# Patient Record
Sex: FEMALE | Race: WHITE | NOT HISPANIC OR LATINO | Employment: FULL TIME | URBAN - METROPOLITAN AREA
[De-identification: names, ages, dates, MRNs, and addresses within clinical notes are randomized per-mention and may not be internally consistent; named-entity substitution may affect disease eponyms.]

---

## 2018-07-20 ENCOUNTER — OFFICE VISIT (OUTPATIENT)
Dept: FAMILY MEDICINE CLINIC | Facility: HOSPITAL | Age: 48
End: 2018-07-20
Payer: COMMERCIAL

## 2018-07-20 VITALS
HEART RATE: 80 BPM | SYSTOLIC BLOOD PRESSURE: 128 MMHG | DIASTOLIC BLOOD PRESSURE: 88 MMHG | BODY MASS INDEX: 46.65 KG/M2 | WEIGHT: 280 LBS | HEIGHT: 65 IN | TEMPERATURE: 98.8 F

## 2018-07-20 DIAGNOSIS — F41.9 ANXIETY: ICD-10-CM

## 2018-07-20 DIAGNOSIS — Z13.6 SCREENING FOR CARDIOVASCULAR CONDITION: ICD-10-CM

## 2018-07-20 DIAGNOSIS — I87.2 VENOUS INSUFFICIENCY, PERIPHERAL: Primary | ICD-10-CM

## 2018-07-20 DIAGNOSIS — M65.4 TENDINITIS, DE QUERVAIN'S: ICD-10-CM

## 2018-07-20 DIAGNOSIS — R60.9 PERIPHERAL EDEMA: ICD-10-CM

## 2018-07-20 PROCEDURE — 99204 OFFICE O/P NEW MOD 45 MIN: CPT | Performed by: FAMILY MEDICINE

## 2018-07-20 PROCEDURE — 3725F SCREEN DEPRESSION PERFORMED: CPT | Performed by: FAMILY MEDICINE

## 2018-07-20 PROCEDURE — 20605 DRAIN/INJ JOINT/BURSA W/O US: CPT | Performed by: FAMILY MEDICINE

## 2018-07-20 RX ORDER — BUSPIRONE HYDROCHLORIDE 5 MG/1
5 TABLET ORAL 3 TIMES DAILY
Qty: 90 TABLET | Refills: 0 | Status: SHIPPED | OUTPATIENT
Start: 2018-07-20 | End: 2018-08-20 | Stop reason: SDUPTHER

## 2018-07-20 RX ORDER — METHYLPREDNISOLONE ACETATE 40 MG/ML
1 INJECTION, SUSPENSION INTRA-ARTICULAR; INTRALESIONAL; INTRAMUSCULAR; SOFT TISSUE
Status: COMPLETED | OUTPATIENT
Start: 2018-07-20 | End: 2018-07-20

## 2018-07-20 RX ADMIN — METHYLPREDNISOLONE ACETATE 1 ML: 40 INJECTION, SUSPENSION INTRA-ARTICULAR; INTRALESIONAL; INTRAMUSCULAR; SOFT TISSUE at 16:42

## 2018-07-20 NOTE — PROGRESS NOTES
Medium joint arthrocentesis  Date/Time: 7/20/2018 4:42 PM  Consent given by: patient  Site marked: site marked  Timeout: Immediately prior to procedure a time out was called to verify the correct patient, procedure, equipment, support staff and site/side marked as required   Supporting Documentation  Indications: pain   Procedure Details  Location: wrist (at thumb extensor) -   Preparation: Patient was prepped and draped in the usual sterile fashion  Needle size: 25 G  Ultrasound guidance: no  Approach: anterolateral  Medications administered: 1 mL methylPREDNISolone acetate 40 mg/mL (0 1 ml of 80mg/ml)    Patient tolerance: patient tolerated the procedure well with no immediate complications  Dressing:  Sterile dressing applied

## 2018-08-16 ENCOUNTER — OFFICE VISIT (OUTPATIENT)
Dept: FAMILY MEDICINE CLINIC | Facility: HOSPITAL | Age: 48
End: 2018-08-16
Payer: COMMERCIAL

## 2018-08-16 VITALS
DIASTOLIC BLOOD PRESSURE: 82 MMHG | HEART RATE: 72 BPM | TEMPERATURE: 98.4 F | HEIGHT: 65 IN | BODY MASS INDEX: 46.85 KG/M2 | SYSTOLIC BLOOD PRESSURE: 138 MMHG | WEIGHT: 281.2 LBS

## 2018-08-16 DIAGNOSIS — F41.1 GENERALIZED ANXIETY DISORDER: ICD-10-CM

## 2018-08-16 DIAGNOSIS — M65.4 TENDINITIS, DE QUERVAIN'S: Primary | ICD-10-CM

## 2018-08-16 DIAGNOSIS — I87.2 VENOUS INSUFFICIENCY: ICD-10-CM

## 2018-08-16 PROBLEM — E78.5 HYPERLIPIDEMIA: Status: ACTIVE | Noted: 2018-08-16

## 2018-08-16 PROCEDURE — 99214 OFFICE O/P EST MOD 30 MIN: CPT | Performed by: FAMILY MEDICINE

## 2018-08-16 PROCEDURE — 3008F BODY MASS INDEX DOCD: CPT | Performed by: FAMILY MEDICINE

## 2018-08-16 RX ORDER — TRAMADOL HYDROCHLORIDE 50 MG/1
50 TABLET ORAL EVERY 8 HOURS PRN
Qty: 30 TABLET | Refills: 0 | Status: SHIPPED | OUTPATIENT
Start: 2018-08-16 | End: 2018-09-07 | Stop reason: SDUPTHER

## 2018-08-16 NOTE — PROGRESS NOTES
Assessment/Plan:        Problem List Items Addressed This Visit        Cardiovascular and Mediastinum    Venous insufficiency       Other    Generalized anxiety disorder      Other Visit Diagnoses     Tendinitis, de Quervain's    -  Primary    Relevant Medications    traMADol (ULTRAM) 50 mg tablet    Other Relevant Orders    Ambulatory referral to Occupational Therapy        Doing well with anxiety  Continue with the buspar  F/u in 3 months  Peripheral edema secondary to venous insufficiecy improved with compression stockings  Continue with leg elevation and slat resctriction  dequervains tendonitis  No improvement after steroid injection  Continue with wrist brace  Advised evaluation with occupational therapy  If no improvement will refer to Dr Lily Cruz  Hyperlipidemia  Advised improving her diet and regular exercise to improve this  To call our office if any concerns/questions at [phone]       ______________________________________________________________      Chief Complaint   Patient presents with    Follow-up     4 week    Wrist Pain     Right       History of Present Illness:  Here for follow up of  Of wirst pain  Minimal improvement with steroid injection  Wearing a wrist brace  She continues to have severe pain  She takes 15 tabs of 200 mg of advil a day  Sometimes still not helping  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  Edema  Improved with use of compression stockings  Better on her days off  No sob, no pnd, no orthopnea  Anxiety  Improved  Able to make it through her day now without panic attacks or increase anxiety  No depression  Tolerating buspar well  Labs were done and were reviewed  Review of Systems   Constitutional: Negative  Negative for activity change, appetite change, chills, diaphoresis, fatigue and fever  HENT: Negative for congestion, facial swelling and sore throat  Respiratory: Negative    Negative for apnea, cough, chest tightness and shortness of breath  Cardiovascular: Negative  Negative for chest pain and palpitations  Gastrointestinal: Negative  Negative for abdominal distention, abdominal pain, blood in stool, constipation, diarrhea and nausea  Genitourinary: Negative  Negative for difficulty urinating, dysuria, flank pain and frequency  Social History     Social History    Marital status: Single     Spouse name: N/A    Number of children: N/A    Years of education: N/A     Occupational History    Not on file  Social History Main Topics    Smoking status: Current Every Day Smoker     Types: Cigarettes    Smokeless tobacco: Never Used    Alcohol use Yes      Comment: Occasionally    Drug use: No    Sexual activity: Not on file     Other Topics Concern    Not on file     Social History Narrative    No narrative on file               No Known Allergies        Vitals:    08/16/18 1014   BP: 138/82   Pulse: 72   Temp: 98 4 °F (36 9 °C)     Body mass index is 46 79 kg/m²  Physical Exam   Constitutional: She appears well-developed and well-nourished  HENT:   Head: Normocephalic  Eyes: EOM are normal  Pupils are equal, round, and reactive to light  Neck: Normal range of motion  Cardiovascular: Normal rate and regular rhythm  Pulmonary/Chest: Effort normal and breath sounds normal    Musculoskeletal: She exhibits no edema  Right wrist: She exhibits tenderness  She exhibits normal range of motion, no bony tenderness, no swelling, no effusion, no crepitus, no deformity and no laceration  Psychiatric: She has a normal mood and affect  Her speech is normal and behavior is normal  Thought content normal  She is not actively hallucinating  She is attentive  Nursing note and vitals reviewed  No Follow-up on file

## 2018-08-20 DIAGNOSIS — F41.9 ANXIETY: ICD-10-CM

## 2018-08-22 RX ORDER — BUSPIRONE HYDROCHLORIDE 5 MG/1
5 TABLET ORAL 3 TIMES DAILY
Qty: 90 TABLET | Refills: 1 | Status: SHIPPED | OUTPATIENT
Start: 2018-08-22 | End: 2018-10-26 | Stop reason: SDUPTHER

## 2018-09-07 DIAGNOSIS — M65.4 TENDINITIS, DE QUERVAIN'S: ICD-10-CM

## 2018-09-07 RX ORDER — TRAMADOL HYDROCHLORIDE 50 MG/1
50 TABLET ORAL EVERY 8 HOURS PRN
Qty: 30 TABLET | Refills: 0 | Status: SHIPPED | OUTPATIENT
Start: 2018-09-07 | End: 2018-09-21 | Stop reason: SDUPTHER

## 2018-09-21 DIAGNOSIS — M65.4 TENDINITIS, DE QUERVAIN'S: ICD-10-CM

## 2018-09-21 RX ORDER — TRAMADOL HYDROCHLORIDE 50 MG/1
50 TABLET ORAL EVERY 8 HOURS PRN
Qty: 30 TABLET | Refills: 0 | Status: SHIPPED | OUTPATIENT
Start: 2018-09-21 | End: 2018-10-08 | Stop reason: SDUPTHER

## 2018-10-08 DIAGNOSIS — M65.4 TENDINITIS, DE QUERVAIN'S: ICD-10-CM

## 2018-10-10 RX ORDER — TRAMADOL HYDROCHLORIDE 50 MG/1
50 TABLET ORAL EVERY 8 HOURS PRN
Qty: 30 TABLET | Refills: 0 | Status: SHIPPED | OUTPATIENT
Start: 2018-10-10 | End: 2018-10-26 | Stop reason: SDUPTHER

## 2018-10-26 DIAGNOSIS — M65.4 TENDINITIS, DE QUERVAIN'S: ICD-10-CM

## 2018-10-26 DIAGNOSIS — F41.9 ANXIETY: ICD-10-CM

## 2018-10-26 RX ORDER — TRAMADOL HYDROCHLORIDE 50 MG/1
50 TABLET ORAL EVERY 8 HOURS PRN
Qty: 30 TABLET | Refills: 3 | Status: SHIPPED | OUTPATIENT
Start: 2018-10-26 | End: 2018-12-30 | Stop reason: SDUPTHER

## 2018-10-26 RX ORDER — BUSPIRONE HYDROCHLORIDE 5 MG/1
5 TABLET ORAL 3 TIMES DAILY
Qty: 90 TABLET | Refills: 0 | Status: SHIPPED | OUTPATIENT
Start: 2018-10-26 | End: 2018-11-21 | Stop reason: SDUPTHER

## 2018-11-21 ENCOUNTER — OFFICE VISIT (OUTPATIENT)
Dept: FAMILY MEDICINE CLINIC | Facility: HOSPITAL | Age: 48
End: 2018-11-21
Payer: COMMERCIAL

## 2018-11-21 VITALS
HEIGHT: 65 IN | DIASTOLIC BLOOD PRESSURE: 78 MMHG | TEMPERATURE: 97.3 F | WEIGHT: 270.4 LBS | SYSTOLIC BLOOD PRESSURE: 118 MMHG | HEART RATE: 67 BPM | BODY MASS INDEX: 45.05 KG/M2

## 2018-11-21 DIAGNOSIS — E78.5 HYPERLIPIDEMIA, UNSPECIFIED HYPERLIPIDEMIA TYPE: ICD-10-CM

## 2018-11-21 DIAGNOSIS — F41.1 GENERALIZED ANXIETY DISORDER: Primary | ICD-10-CM

## 2018-11-21 DIAGNOSIS — F41.9 ANXIETY: ICD-10-CM

## 2018-11-21 DIAGNOSIS — F17.210 CIGARETTE NICOTINE DEPENDENCE WITHOUT COMPLICATION: ICD-10-CM

## 2018-11-21 DIAGNOSIS — I87.2 VENOUS INSUFFICIENCY: ICD-10-CM

## 2018-11-21 PROCEDURE — 99214 OFFICE O/P EST MOD 30 MIN: CPT | Performed by: FAMILY MEDICINE

## 2018-11-21 PROCEDURE — 3008F BODY MASS INDEX DOCD: CPT | Performed by: FAMILY MEDICINE

## 2018-11-21 RX ORDER — BUSPIRONE HYDROCHLORIDE 5 MG/1
5 TABLET ORAL 3 TIMES DAILY
Qty: 270 TABLET | Refills: 1 | Status: SHIPPED | OUTPATIENT
Start: 2018-11-21 | End: 2019-09-19

## 2018-11-21 NOTE — PROGRESS NOTES
ASSESSMENT/PLAN:    Problem List Items Addressed This Visit        Cardiovascular and Mediastinum    Venous insufficiency       Other    Cigarette nicotine dependence without complication    Generalized anxiety disorder - Primary    Relevant Medications    busPIRone (BUSPAR) 5 mg tablet    Hyperlipidemia      Other Visit Diagnoses     Anxiety        Relevant Medications    busPIRone (BUSPAR) 5 mg tablet        Patient is doing well  NORMA  Stable  Doing well with buspar  HLD  Working on diet and increase activity  Nicotine  No plans of quitting at this time  Needing to get her household members to quit as well  Venous insufficiency  Stable  Improved with use of compression stockings  Return in about 6 months (around 5/21/2019)  To call our office if any concerns/questions at [phone]   ______________________________________________________________________          Patient is here for follow up of chronic conditions  Chief Complaint   Patient presents with    Follow-up     3 month        History of Present Illness:     Here for f/u of chronic conditions  Doing well  No new concerns  Reports anxiety is well controlled with no thoughts of depression  No si/hi  Leg swelling is controlled with use of compression stockings  ocntinues to smoke with no current plans of quitting  Multiple smokers in the home and hard to quit alone  Review of Systems   Constitutional: Negative  Negative for activity change, appetite change, chills, diaphoresis, fatigue and fever  HENT: Negative for congestion, facial swelling and sore throat  Respiratory: Negative  Negative for apnea, cough, chest tightness and shortness of breath  Cardiovascular: Negative  Negative for chest pain and palpitations  Gastrointestinal: Negative  Negative for abdominal distention, abdominal pain, blood in stool, constipation, diarrhea and nausea  Genitourinary: Negative    Negative for difficulty urinating, dysuria, flank pain and frequency  Social History     Social History    Marital status: Single     Spouse name: N/A    Number of children: N/A    Years of education: N/A     Occupational History    Not on file  Social History Main Topics    Smoking status: Current Every Day Smoker     Types: Cigarettes    Smokeless tobacco: Never Used    Alcohol use Yes      Comment: Occasionally    Drug use: No    Sexual activity: Not on file     Other Topics Concern    Not on file     Social History Narrative    No narrative on file               No Known Allergies      There is no immunization history on file for this patient  Vitals:    11/21/18 1713   BP: 118/78   Pulse: 67   Temp: (!) 97 3 °F (36 3 °C)     Body mass index is 45 kg/m²  Physical Exam   Constitutional: She appears well-developed and well-nourished  HENT:   Head: Normocephalic and atraumatic  Eyes: Pupils are equal, round, and reactive to light  Conjunctivae and EOM are normal    Neck: Normal range of motion  Neck supple  Cardiovascular: Normal rate, regular rhythm and normal heart sounds  Pulmonary/Chest: Effort normal and breath sounds normal    Abdominal: Soft  Bowel sounds are normal    Skin: Skin is warm and dry  Psychiatric: She has a normal mood and affect  Nursing note and vitals reviewed      Diabetic Foot Exam                Health Maintenance Due   Topic Date Due    Pneumococcal PPSV23 Medium Risk Adult (1 of 1 - PPSV23) 09/22/1991    DTaP,Tdap,and Td Vaccines (1 - Tdap) 09/22/1993    INFLUENZA VACCINE  07/01/2018

## 2018-12-30 DIAGNOSIS — M65.4 TENDINITIS, DE QUERVAIN'S: ICD-10-CM

## 2018-12-31 RX ORDER — TRAMADOL HYDROCHLORIDE 50 MG/1
TABLET ORAL
Qty: 30 TABLET | Refills: 0 | Status: SHIPPED | OUTPATIENT
Start: 2018-12-31 | End: 2019-01-18 | Stop reason: SDUPTHER

## 2019-01-18 DIAGNOSIS — M65.4 TENDINITIS, DE QUERVAIN'S: ICD-10-CM

## 2019-01-21 RX ORDER — TRAMADOL HYDROCHLORIDE 50 MG/1
TABLET ORAL
Qty: 30 TABLET | Refills: 0 | Status: SHIPPED | OUTPATIENT
Start: 2019-01-21 | End: 2019-02-05 | Stop reason: SDUPTHER

## 2019-02-05 DIAGNOSIS — M65.4 TENDINITIS, DE QUERVAIN'S: ICD-10-CM

## 2019-02-06 RX ORDER — TRAMADOL HYDROCHLORIDE 50 MG/1
TABLET ORAL
Qty: 30 TABLET | Refills: 0 | Status: SHIPPED | OUTPATIENT
Start: 2019-02-06

## 2019-02-28 DIAGNOSIS — M65.4 TENDINITIS, DE QUERVAIN'S: ICD-10-CM

## 2019-02-28 RX ORDER — TRAMADOL HYDROCHLORIDE 50 MG/1
TABLET ORAL
Qty: 30 TABLET | Refills: 0 | OUTPATIENT
Start: 2019-02-28

## 2019-02-28 NOTE — TELEPHONE ENCOUNTER
Please call  Patient with request for tramadol  This was initially given for wrist pain  Appears that the refill request has been more frequent  IF this is still hurting I highly recommend seeing Orthopedics, Dr America Pickens  I did not fill the tramadol  This was filled 3 weeks ago

## 2019-07-18 ENCOUNTER — APPOINTMENT (EMERGENCY)
Dept: RADIOLOGY | Facility: HOSPITAL | Age: 49
End: 2019-07-18
Payer: OTHER MISCELLANEOUS

## 2019-07-18 ENCOUNTER — HOSPITAL ENCOUNTER (EMERGENCY)
Facility: HOSPITAL | Age: 49
Discharge: HOME | End: 2019-07-18
Attending: EMERGENCY MEDICINE | Admitting: EMERGENCY MEDICINE
Payer: OTHER MISCELLANEOUS

## 2019-07-18 VITALS
SYSTOLIC BLOOD PRESSURE: 129 MMHG | HEIGHT: 67 IN | BODY MASS INDEX: 37.67 KG/M2 | OXYGEN SATURATION: 96 % | TEMPERATURE: 97.2 F | HEART RATE: 65 BPM | RESPIRATION RATE: 16 BRPM | DIASTOLIC BLOOD PRESSURE: 61 MMHG | WEIGHT: 240 LBS

## 2019-07-18 DIAGNOSIS — S43.014A ANTERIOR DISLOCATION OF RIGHT SHOULDER, INITIAL ENCOUNTER: Primary | ICD-10-CM

## 2019-07-18 DIAGNOSIS — S80.02XA CONTUSION OF LEFT KNEE: ICD-10-CM

## 2019-07-18 PROCEDURE — 23650 CLTX SHO DSLC W/MNPJ WO ANES: CPT | Performed by: EMERGENCY MEDICINE

## 2019-07-18 PROCEDURE — 73030 X-RAY EXAM OF SHOULDER: CPT

## 2019-07-18 PROCEDURE — 99285 EMERGENCY DEPT VISIT HI MDM: CPT | Performed by: EMERGENCY MEDICINE

## 2019-07-18 PROCEDURE — 99153 MOD SED SAME PHYS/QHP EA: CPT | Performed by: EMERGENCY MEDICINE

## 2019-07-18 PROCEDURE — 96374 THER/PROPH/DIAG INJ IV PUSH: CPT

## 2019-07-18 PROCEDURE — 73564 X-RAY EXAM KNEE 4 OR MORE: CPT

## 2019-07-18 PROCEDURE — 99152 MOD SED SAME PHYS/QHP 5/>YRS: CPT | Performed by: EMERGENCY MEDICINE

## 2019-07-18 PROCEDURE — 96372 THER/PROPH/DIAG INJ SC/IM: CPT

## 2019-07-18 PROCEDURE — 99284 EMERGENCY DEPT VISIT MOD MDM: CPT

## 2019-07-18 RX ORDER — MIDAZOLAM HYDROCHLORIDE 1 MG/ML
5 INJECTION INTRAMUSCULAR; INTRAVENOUS ONCE
Status: COMPLETED | OUTPATIENT
Start: 2019-07-18 | End: 2019-07-18

## 2019-07-18 RX ORDER — KETOROLAC TROMETHAMINE 30 MG/ML
30 INJECTION, SOLUTION INTRAMUSCULAR; INTRAVENOUS ONCE
Status: COMPLETED | OUTPATIENT
Start: 2019-07-18 | End: 2019-07-18

## 2019-07-18 RX ORDER — FENTANYL CITRATE 50 UG/ML
100 INJECTION, SOLUTION INTRAMUSCULAR; INTRAVENOUS ONCE
Status: COMPLETED | OUTPATIENT
Start: 2019-07-18 | End: 2019-07-18

## 2019-07-18 RX ADMIN — FENTANYL CITRATE 100 MCG: 50 INJECTION, SOLUTION INTRAMUSCULAR; INTRAVENOUS at 12:20

## 2019-07-18 RX ADMIN — KETOROLAC TROMETHAMINE 30 MG: 30 INJECTION, SOLUTION INTRAMUSCULAR; INTRAVENOUS at 10:28

## 2019-07-18 RX ADMIN — MIDAZOLAM HYDROCHLORIDE 3 MG: 1 INJECTION, SOLUTION INTRAMUSCULAR; INTRAVENOUS at 12:20

## 2019-07-18 NOTE — ED PROVIDER NOTES
History  Chief Complaint   Patient presents with    Shoulder Injury     pt presents to ER stating she was at work, the shipment that came in [address] stacked correctly, tipped over, patient then tried to get out of the way, tripped and fell into a wall on her right arm  states her right shoulder hurts and feels as though she cant move it  This is a 45-year-old female presents for evaluation after she tripped over boxes that fell at work complaining of right shoulder and knee pain no head injury or loss of consciousness last meal was at 7 o'clock this morning coffee in a eggs      History provided by:  Patient  Injury   Location:  Right shoulder and left knee pain  Quality:  Achy  Severity:  Severe  Onset quality:  Sudden  Timing:  Constant  Progression:  Unchanged  Chronicity:  New  Context:  Trip and fell at work  Relieved by:  Nothing  Worsened by: Movement  Associated symptoms: no abdominal pain        Prior to Admission Medications   Prescriptions Last Dose Informant Patient Reported? Taking?   busPIRone (BUSPAR) 5 mg tablet   No No   Sig: Take 1 tablet (5 mg total) by mouth 3 (three) times a day for 180 days   traMADol (ULTRAM) 50 mg tablet   No No   Sig: TAKE 1 TABLET BY MOUTH EVERY 8 HOURS AS NEEDED FOR MODERATE PAIN      Facility-Administered Medications: None       Past Medical History:   Diagnosis Date    Depression        Past Surgical History:   Procedure Laterality Date    ECTOPIC PREGNANCY SURGERY  2010       History reviewed  No pertinent family history  I have reviewed and agree with the history as documented  Social History     Tobacco Use    Smoking status: Current Every Day Smoker     Packs/day: 0 50     Types: Cigarettes    Smokeless tobacco: Never Used   Substance Use Topics    Alcohol use: Yes     Comment: Occasionally    Drug use: No        Review of Systems   Gastrointestinal: Negative for abdominal pain     Musculoskeletal:        Right shoulder and left knee pain   All other systems reviewed and are negative  Physical Exam  Physical Exam   Constitutional: She is oriented to person, place, and time  She appears well-developed and well-nourished  No distress  HENT:   Head: Normocephalic and atraumatic  Right Ear: External ear normal    Left Ear: External ear normal    Nose: Nose normal    Mouth/Throat: Oropharynx is clear and moist    Eyes: Pupils are equal, round, and reactive to light  EOM are normal  Right eye exhibits no discharge  Left eye exhibits no discharge  No scleral icterus  Neck: Neck supple  No JVD present  No tracheal deviation present  Cardiovascular: Normal rate, regular rhythm and intact distal pulses  Exam reveals no gallop and no friction rub  No murmur heard  Pulmonary/Chest: Effort normal and breath sounds normal  No stridor  No respiratory distress  She has no wheezes  She has no rales  Abdominal: Soft  Bowel sounds are normal  She exhibits no distension and no mass  There is no tenderness  There is no guarding  Musculoskeletal:   Right shoulder pain with deformity and decreased range of motion secondary to pain  Pulses and gross sensation are intact small contusion to the left knee   Neurological: She is alert and oriented to person, place, and time  She displays normal reflexes  No cranial nerve deficit or sensory deficit  She exhibits normal muscle tone  Coordination normal    Skin: Skin is warm and dry  No rash noted  She is not diaphoretic  Psychiatric: She has a normal mood and affect  Her behavior is normal  Thought content normal    Nursing note and vitals reviewed        Vital Signs  ED Triage Vitals [07/18/19 0954]   Temperature Pulse Respirations Blood Pressure SpO2   (!) 97 2 °F (36 2 °C) 84 19 157/86 95 %      Temp Source Heart Rate Source Patient Position - Orthostatic VS BP Location FiO2 (%)   Temporal Monitor Lying Right arm --      Pain Score       Worst Possible Pain           Vitals:    07/18/19 1231 07/18/19 1235 07/18/19 1240 07/18/19 1245   BP: 106/56 106/55 104/56 106/56   Pulse: 77 76 68 74   Patient Position - Orthostatic VS: Lying Lying Lying Lying         Visual Acuity      ED Medications  Medications   ketorolac (TORADOL) injection 30 mg (30 mg Intramuscular Given 7/18/19 1028)   fentanyl citrate (PF) 100 MCG/2ML 100 mcg (50 mcg Intravenous Given 7/18/19 1220)   midazolam (VERSED) injection 5 mg (3 mg Intravenous Given 7/18/19 1220)       Diagnostic Studies  Results Reviewed     None                 XR shoulder 2+ views RIGHT   Final Result by Latasha Alvarez MD (07/18 1032)      Right shoulder dislocation  The study was marked in EPIC for significant notification  Workstation performed: [id number]         XR knee 4+ vw left injury   Final Result by Latasha Alvarez MD (07/18 1038)      No acute osseous abnormality  Workstation performed: [id number]         XR shoulder 2+ views RIGHT    (Results Pending)              Procedures  Pre-Procedural Sedation  Performed by: Sara Mott DO  Authorized by: Sara Mott DO     Consent:     Consent obtained:  Written    Consent given by:  Patient    Risks discussed:  Prolonged sedation necessitating reversal, respiratory compromise necessitating ventilatory assistance and intubation, vomiting, nausea and allergic reaction  Universal protocol:     Patient identity confirmation method:  Verbally with patient and arm band  Indications:     Sedation purpose:  Dislocation reduction    Procedure necessitating sedation performed by:  Physician performing sedation    Intended level of sedation:  Moderate (conscious sedation)  Pre-sedation assessment:     Time since last food or drink:  7:00 a m   Coffee and eggs    ASA classification: class 1 - normal, healthy patient      Neck mobility: normal      Mouth opening:  3 or more finger widths    Mallampati score:  II - soft palate, uvula, fauces visible    Pre-sedation assessments completed and reviewed: airway patency, cardiovascular function, mental status, nausea/vomiting, pain level, respiratory function and temperature      History of difficult intubation: no      Pre-sedation assessment completed:  7/18/2019 12:00 PM  Procedural Sedation  Date/Time: 7/18/2019 12:10 PM  Performed by: Hilda Schneider DO  Authorized by: Hilda Schneider DO     Procedure details (see MAR for exact dosages):     Preoxygenation:  Nasal cannula    Sedation:  Midazolam    Analgesia:  Fentanyl    Intra-procedure monitoring:  Blood pressure monitoring, cardiac monitor, continuous pulse oximetry, frequent LOC assessments and frequent vital sign checks    Intra-procedure events: none      Sedation end time:  7/18/2019 12:30 PM    Total sedation time (minutes):  30  Post-procedure details:     Attendance: Constant attendance by certified staff until patient recovered      Post-sedation assessments completed and reviewed: airway patency, mental status, nausea/vomiting, pain level and respiratory function      Patient is stable for discharge or admission: yes      Patient tolerance: Tolerated well, no immediate complications  Orthopedic injury treatment  Date/Time: 7/18/2019 12:37 PM  Performed by: Hilda Schneider DO  Authorized by: Hilda Schneider DO     Patient Location:  ED  Injury location:  Shoulder  Location details:  Right shoulder  Injury type:  Dislocation  Dislocation type: anterior    Chronicity:  New  Neurovascular status: Neurovascularly intact    Distal perfusion: normal    Neurological function: normal    Range of motion: reduced    Sedation type:   Moderate (conscious) sedation (See separate Procedural Sedation form)  Manipulation performed?: Yes    Reduction method:  Traction and counter traction  Reduction method:  Traction and counter traction  Reduction method:  Traction and counter traction  Reduction method:  Traction and counter traction  Reduction method:  Traction and counter traction  Reduction method:  Traction and counter traction  Reduction successful?: Yes    Confirmation: Reduction confirmed by x-ray    Immobilization:  Sling  Neurovascular status: Neurovascularly intact    Distal perfusion: normal    Neurological function: normal    Range of motion: improved    Patient tolerance:  Patient tolerated the procedure well with no immediate complications      Conscious Sedation Assessment      Classification Score   ASA Scale Assessment  2-Mild to moderate systemic disease, medically well controlled, with no functional limitation filed at 07/18/2019 1215           ED Course  ED Course as of Jul 18 1247   Thu Jul 18, 2019   1244 Post reduction x-ray appears to be in good position      1244 Tingling in the fingers has resolved gross sensation and pulses are normal and movement to all digits of the right hand is normal                                  MDM    Disposition  Final diagnoses:   Anterior dislocation of right shoulder, initial encounter   Contusion of left knee     Time reflects when diagnosis was documented in both MDM as applicable and the Disposition within this note     Time User Action Codes Description Comment    7/18/2019 12:42 PM Asmita Sr Add [S43 014A] Anterior dislocation of right shoulder, initial encounter     7/18/2019 12:42 PM Asmita Sr Add [S80 02XA] Contusion of left knee       ED Disposition     ED Disposition Condition Date/Time Comment    Discharge Stable Thu Jul 18, 2019 12:45 PM Corbin Hebert discharge to home/self care              Follow-up Information     Follow up With Specialties Details Why Contact Info Additional [address] [facility] In 1 week Recheck [address] [facility]  [place] [id number] [address] [facility], [address] , [place], [place], [address]          Patient's Medications   Discharge Prescriptions    No medications on file     No discharge procedures on file     ED Provider  Electronically Signed by           Angeli Porras, DO  07/18/19 5760 [facility], DO  08/09/19 6266

## 2019-07-25 ENCOUNTER — APPOINTMENT (OUTPATIENT)
Dept: RADIOLOGY | Facility: CLINIC | Age: 49
End: 2019-07-25
Payer: OTHER MISCELLANEOUS

## 2019-07-25 ENCOUNTER — OFFICE VISIT (OUTPATIENT)
Dept: OBGYN CLINIC | Facility: CLINIC | Age: 49
End: 2019-07-25
Payer: OTHER MISCELLANEOUS

## 2019-07-25 VITALS
HEIGHT: 66 IN | WEIGHT: 266 LBS | DIASTOLIC BLOOD PRESSURE: 80 MMHG | SYSTOLIC BLOOD PRESSURE: 122 MMHG | BODY MASS INDEX: 42.75 KG/M2 | HEART RATE: 84 BPM

## 2019-07-25 DIAGNOSIS — S43.014A TRAUMATIC ANTERIOR DISLOCATION OF RIGHT SHOULDER, INITIAL ENCOUNTER: Primary | ICD-10-CM

## 2019-07-25 DIAGNOSIS — S43.004A SHOULDER DISLOCATION, RIGHT, INITIAL ENCOUNTER: ICD-10-CM

## 2019-07-25 PROCEDURE — 73030 X-RAY EXAM OF SHOULDER: CPT

## 2019-07-25 PROCEDURE — 99203 OFFICE O/P NEW LOW 30 MIN: CPT | Performed by: ORTHOPAEDIC SURGERY

## 2019-07-25 RX ORDER — IBUPROFEN 200 MG
TABLET ORAL EVERY 6 HOURS PRN
COMMUNITY

## 2019-07-25 NOTE — ASSESSMENT & PLAN NOTE
Findings consistent with right shoulder anterior dislocation  Discussed findings and treatment options with the patient  I reviewed patient's right shoulder x-ray with her  I discussed prognosis of her shoulder injury  I recommended patient to start doing pendulum and passive range of motion as tolerated  Patient should continue use the sling for comfort  She should remove the sling and move her elbow to prevent stiffness  We will limit patient's work activity using the right arm  I will see patient back in 4 weeks for re-evaluation  If patient continued to have difficulty lifting her right arm, may have to consider MRI of her right shoulder to assess the rotator cuff  All patient's questions were answered to her satisfaction  This note is created using dictation transcription  It may contain typographical errors, grammatical errors, improperly dictated words, background noise and other errors

## 2019-07-25 NOTE — PROGRESS NOTES
Assessment:     1  Traumatic anterior dislocation of right shoulder, initial encounter        Plan:     Problem List Items Addressed This Visit        Musculoskeletal and Integument    Traumatic anterior dislocation of right shoulder - Primary     Findings consistent with right shoulder anterior dislocation  Discussed findings and treatment options with the patient  I reviewed patient's right shoulder x-ray with her  I discussed prognosis of her shoulder injury  I recommended patient to start doing pendulum and passive range of motion as tolerated  Patient should continue use the sling for comfort  She should remove the sling and move her elbow to prevent stiffness  We will limit patient's work activity using the right arm  I will see patient back in 4 weeks for re-evaluation  If patient continued to have difficulty lifting her right arm, may have to consider MRI of her right shoulder to assess the rotator cuff  All patient's questions were answered to her satisfaction  This note is created using dictation transcription  It may contain typographical errors, grammatical errors, improperly dictated words, background noise and other errors  Relevant Orders    XR shoulder 2+ vw right         Subjective:     Patient ID: Leelee Houge is a 57 y o  female  Chief Complaint:  31-year-old female injured her right shoulder when she fell at work on 7/18/2019  She had immediate pain in her right shoulder with difficulty moving the arm  She denied other injury  No loss of consciousness  Patient was seen in the ER and found to have dislocated her right shoulder  She had conscious sedation and closed reduction of the shoulder which appeared to be successful  She continued to complain of pain in her shoulder and difficulty moving her right arm due to pain in the shoulder  She has been using a sling  She denies pain in her neck  Information on patient's intake form was reviewed      Allergy:  No Known Allergies  Medications:  all current active meds have been reviewed  Past Medical History:  Past Medical History:   Diagnosis Date    Depression      Past Surgical History:  Past Surgical History:   Procedure Laterality Date    ECTOPIC PREGNANCY SURGERY  2010     Family History:  Family History   Problem Relation Age of Onset    Diabetes Mother     Hypertension Mother     Heart disease Father     Hypertension Sister      Social History:  Social History     Substance and Sexual Activity   Alcohol Use Yes    Comment: Occasionally     Social History     Substance and Sexual Activity   Drug Use No     Social History     Tobacco Use   Smoking Status Current Every Day Smoker    Packs/day: 0 50    Types: Cigarettes   Smokeless Tobacco Never Used     Review of Systems   Constitutional: Negative  HENT: Negative  Eyes: Negative  Respiratory: Negative  Cardiovascular: Negative  Gastrointestinal: Negative  Endocrine: Negative  Genitourinary: Negative  Musculoskeletal: Positive for arthralgias (Right shoulder)  Skin: Negative  Allergic/Immunologic: Negative  Neurological: Positive for weakness (Right shoulder)  Negative for numbness  Hematological: Negative  Psychiatric/Behavioral: Negative  Objective:  BP Readings from Last 1 Encounters:   07/25/19 122/80      Wt Readings from Last 1 Encounters:   07/25/19 121 kg (266 lb)      BMI:   Estimated body mass index is 42 93 kg/m² as calculated from the following:    Height as of this encounter: 5' 6" (1 676 m)  Weight as of this encounter: 121 kg (266 lb)  BSA:   Estimated body surface area is 2 26 meters squared as calculated from the following:    Height as of this encounter: 5' 6" (1 676 m)  Weight as of this encounter: 121 kg (266 lb)  Physical Exam   Constitutional: She is oriented to person, place, and time  She appears well-developed  HENT:   Head: Normocephalic and atraumatic     Eyes: Conjunctivae and EOM are normal    Neck: Neck supple  Pulmonary/Chest: Effort normal    Neurological: She is alert and oriented to person, place, and time  Skin: Skin is warm  Psychiatric: She has a normal mood and affect  Nursing note and vitals reviewed  Right Shoulder Exam     Tenderness   Right shoulder tenderness location: Diffuse tenderness anteriorly  Range of Motion   Active abduction: abnormal   Passive abduction: abnormal   Forward flexion: abnormal     Tests   Drop arm: positive    Other   Erythema: absent  Sensation: normal  Pulse: present    Comments:  She has limited active and passive range of motion            I have personally reviewed pertinent films in PACS and my interpretation is Right shoulder show good alignment  Type 2 acromion process  Acromioclavicular joint osteoarthritis  No soft tissue calcification or fracture

## 2019-08-22 ENCOUNTER — OFFICE VISIT (OUTPATIENT)
Dept: OBGYN CLINIC | Facility: CLINIC | Age: 49
End: 2019-08-22
Payer: OTHER MISCELLANEOUS

## 2019-08-22 VITALS
HEIGHT: 66 IN | HEART RATE: 78 BPM | DIASTOLIC BLOOD PRESSURE: 80 MMHG | WEIGHT: 276 LBS | SYSTOLIC BLOOD PRESSURE: 120 MMHG | BODY MASS INDEX: 44.36 KG/M2

## 2019-08-22 DIAGNOSIS — S43.014A TRAUMATIC ANTERIOR DISLOCATION OF RIGHT SHOULDER, INITIAL ENCOUNTER: Primary | ICD-10-CM

## 2019-08-22 PROCEDURE — 99213 OFFICE O/P EST LOW 20 MIN: CPT | Performed by: ORTHOPAEDIC SURGERY

## 2019-08-22 NOTE — PROGRESS NOTES
Assessment:     1  Traumatic anterior dislocation of right shoulder, initial encounter        Plan:     Problem List Items Addressed This Visit        Musculoskeletal and Integument    Traumatic anterior dislocation of right shoulder - Primary    Relevant Orders    Ambulatory referral to Physical Therapy          Status post right shoulder dislocation sustained at work on 07/18/2019  · Discontinue sling  · Avoid internal rotation with overhead motions  · Start physical therapy  · Work limitation discussed with patient and her    · Follow up 6 weeks    Subjective:     Patient ID: Lisette Rojas is a 57 y o  female  Chief Complaint:  Patient presents to the office status post right shoulder dislocation sustained due to a fall at work on 07/18/2019  She has been compliant with her sling  She reports improved pain and tingling sensations since last visit  She has no new complaints or concerns today in the office  Allergy:  No Known Allergies  Medications:  all current active meds have been reviewed  Past Medical History:  Past Medical History:   Diagnosis Date    Depression      Past Surgical History:  Past Surgical History:   Procedure Laterality Date    ECTOPIC PREGNANCY SURGERY  2010     Family History:  Family History   Problem Relation Age of Onset    Diabetes Mother     Hypertension Mother     Heart disease Father     Hypertension Sister      Social History:  Social History     Substance and Sexual Activity   Alcohol Use Yes    Comment: Occasionally     Social History     Substance and Sexual Activity   Drug Use No     Social History     Tobacco Use   Smoking Status Current Every Day Smoker    Packs/day: 0 50    Types: Cigarettes   Smokeless Tobacco Never Used     Review of Systems   Constitutional: Negative for fever and unexpected weight change  HENT: Negative for hearing loss, nosebleeds and sore throat  Eyes: Negative for pain, redness and visual disturbance     Respiratory: Negative for cough, shortness of breath and wheezing  Cardiovascular: Negative for chest pain, palpitations and leg swelling  Gastrointestinal: Negative for abdominal pain, nausea and vomiting  Endocrine: Negative for polydipsia and polyuria  Genitourinary: Negative for dysuria and hematuria  Musculoskeletal: Negative for arthralgias  Skin: Negative for rash and wound  Neurological: Negative for dizziness and headaches  Psychiatric/Behavioral: Negative for agitation and suicidal ideas  Objective:  BP Readings from Last 1 Encounters:   08/22/19 120/80      Wt Readings from Last 1 Encounters:   08/22/19 125 kg (276 lb)      BMI:   Estimated body mass index is 44 55 kg/m² as calculated from the following:    Height as of this encounter: 5' 6" (1 676 m)  Weight as of this encounter: 125 kg (276 lb)  BSA:   Estimated body surface area is 2 29 meters squared as calculated from the following:    Height as of this encounter: 5' 6" (1 676 m)  Weight as of this encounter: 125 kg (276 lb)  Physical Exam   Constitutional: She is oriented to person, place, and time  She appears well-developed and well-nourished  HENT:   Head: Normocephalic and atraumatic  Eyes: Conjunctivae and EOM are normal    Neck: Neck supple  Cardiovascular: Intact distal pulses  Pulmonary/Chest: Effort normal    Neurological: She is alert and oriented to person, place, and time  Skin: Skin is warm and dry  Psychiatric: She has a normal mood and affect  Her behavior is normal      Right Shoulder Exam     Tenderness   The patient is experiencing no tenderness      Range of Motion   Active abduction: 40   Forward flexion: 60     Muscle Strength   Abduction: 4/5   Internal rotation: 5/5   External rotation: 4/5     Other   Erythema: absent  Sensation: normal  Pulse: present    Comments:  She is able to actively resist abduction      Left Shoulder Exam   Left shoulder exam is normal     Range of Motion   The patient has normal left shoulder ROM  Muscle Strength   The patient has normal left shoulder strength      Other   Sensation: normal  Pulse: present               Scribe Attestation    I,:   Randi Garza am acting as a scribe while in the presence of the attending physician :        I,:   Katarzyna Christian MD personally performed the services described in this documentation    as scribed in my presence :

## 2019-09-19 ENCOUNTER — OFFICE VISIT (OUTPATIENT)
Dept: OBGYN CLINIC | Facility: CLINIC | Age: 49
End: 2019-09-19
Payer: OTHER MISCELLANEOUS

## 2019-09-19 VITALS
HEIGHT: 66 IN | SYSTOLIC BLOOD PRESSURE: 124 MMHG | DIASTOLIC BLOOD PRESSURE: 80 MMHG | WEIGHT: 275 LBS | BODY MASS INDEX: 44.2 KG/M2 | HEART RATE: 80 BPM

## 2019-09-19 DIAGNOSIS — S43.014D: Primary | ICD-10-CM

## 2019-09-19 PROCEDURE — 99024 POSTOP FOLLOW-UP VISIT: CPT | Performed by: ORTHOPAEDIC SURGERY

## 2019-09-19 NOTE — PROGRESS NOTES
Assessment:     1  Traumatic anterior dislocation of right shoulder, subsequent encounter        Plan:  The patient was seen and examined by Dr Jeanne Palmer and myself  Problem List Items Addressed This Visit        Musculoskeletal and Integument    Traumatic anterior dislocation of right shoulder - Primary     Findings consistent with right shoulder anterior dislocation  Discussed findings and treatment options with the patient  She is feeling improvement with physical therapy  Continue formal physical therapy and work restrictions  She is to avoid overhead activities  Follow-up in 4 weeks for re-evaluation  All patient's questions were answered to her satisfaction  This note is created using dictation transcription  It may contain typographical errors, grammatical errors, improperly dictated words, background noise and other errors  Subjective:     Patient ID: Karlene Stevens is a 57 y o  female  Chief Complaint:  Patient presents to the office status post right shoulder dislocation sustained due to a fall at work on 07/18/2019  She has attended 6 sessions of physical therapy so far  She does feel significant improvement  Her pain level has improved significantly as well  She is working with current restrictions with no issues      Allergy:  No Known Allergies  Medications:  all current active meds have been reviewed  Past Medical History:  Past Medical History:   Diagnosis Date    Depression      Past Surgical History:  Past Surgical History:   Procedure Laterality Date    ECTOPIC PREGNANCY SURGERY  2010     Family History:  Family History   Problem Relation Age of Onset    Diabetes Mother     Hypertension Mother     Heart disease Father     Hypertension Sister      Social History:  Social History     Substance and Sexual Activity   Alcohol Use Yes    Comment: Occasionally     Social History     Substance and Sexual Activity   Drug Use No     Social History     Tobacco Use   Smoking Status Current Every Day Smoker    Packs/day: 0 50    Types: Cigarettes   Smokeless Tobacco Never Used     Review of Systems   Constitutional: Negative for fever and unexpected weight change  HENT: Negative for hearing loss, nosebleeds and sore throat  Eyes: Negative for pain, redness and visual disturbance  Respiratory: Negative for cough, shortness of breath and wheezing  Cardiovascular: Negative for chest pain, palpitations and leg swelling  Gastrointestinal: Negative for abdominal pain, nausea and vomiting  Endocrine: Negative for polydipsia and polyuria  Genitourinary: Negative for dysuria and hematuria  Musculoskeletal: Negative for arthralgias  Skin: Negative for rash and wound  Neurological: Negative for dizziness and headaches  Psychiatric/Behavioral: Negative for agitation and suicidal ideas  Objective:  BP Readings from Last 1 Encounters:   09/19/19 124/80      Wt Readings from Last 1 Encounters:   09/19/19 125 kg (275 lb)      BMI:   Estimated body mass index is 44 39 kg/m² as calculated from the following:    Height as of this encounter: 5' 6" (1 676 m)  Weight as of this encounter: 125 kg (275 lb)  BSA:   Estimated body surface area is 2 29 meters squared as calculated from the following:    Height as of this encounter: 5' 6" (1 676 m)  Weight as of this encounter: 125 kg (275 lb)  Physical Exam   Constitutional: She is oriented to person, place, and time  She appears well-developed and well-nourished  HENT:   Head: Normocephalic and atraumatic  Eyes: Conjunctivae and EOM are normal    Neck: Neck supple  Cardiovascular: Intact distal pulses  Pulmonary/Chest: Effort normal    Neurological: She is alert and oriented to person, place, and time  Skin: Skin is warm and dry  Psychiatric: She has a normal mood and affect  Her behavior is normal      Right Shoulder Exam     Tenderness   The patient is experiencing no tenderness      Range of Motion   Active abduction: 90   Passive abduction: 150   Forward flexion: 90     Muscle Strength   Abduction: 4/5   Internal rotation: 5/5   External rotation: 4/5     Other   Erythema: absent  Sensation: normal  Pulse: present    Comments:  She is able to actively resist abduction      Left Shoulder Exam   Left shoulder exam is normal     Range of Motion   The patient has normal left shoulder ROM  Muscle Strength   The patient has normal left shoulder strength      Other   Sensation: normal  Pulse: present

## 2019-09-19 NOTE — ASSESSMENT & PLAN NOTE
Findings consistent with right shoulder anterior dislocation  Discussed findings and treatment options with the patient  She is feeling improvement with physical therapy  Continue formal physical therapy and work restrictions  She is to avoid overhead activities  Follow-up in 4 weeks for re-evaluation  All patient's questions were answered to her satisfaction  This note is created using dictation transcription  It may contain typographical errors, grammatical errors, improperly dictated words, background noise and other errors

## 2019-10-16 ENCOUNTER — OFFICE VISIT (OUTPATIENT)
Dept: OBGYN CLINIC | Facility: CLINIC | Age: 49
End: 2019-10-16
Payer: OTHER MISCELLANEOUS

## 2019-10-16 VITALS
HEIGHT: 66 IN | BODY MASS INDEX: 44.36 KG/M2 | DIASTOLIC BLOOD PRESSURE: 80 MMHG | SYSTOLIC BLOOD PRESSURE: 118 MMHG | WEIGHT: 276 LBS | HEART RATE: 78 BPM

## 2019-10-16 DIAGNOSIS — S43.014D: Primary | ICD-10-CM

## 2019-10-16 PROCEDURE — 99024 POSTOP FOLLOW-UP VISIT: CPT | Performed by: ORTHOPAEDIC SURGERY

## 2019-10-16 NOTE — PROGRESS NOTES
Assessment:     1  Traumatic anterior dislocation of right shoulder, subsequent encounter        Plan:     Problem List Items Addressed This Visit        Musculoskeletal and Integument    Traumatic anterior dislocation of right shoulder - Primary     Findings consistent with right shoulder anterior dislocation  Discussed findings and treatment options with the patient  Patient's shoulder function is improving  I recommended her to continue physical therapy to rehabilitate the shoulder  We will continue to limit her work activity using the right arm  I would like to see patient back in 4 weeks for re-evaluation  Her progress was discussed with her   All patient's questions were answered to her satisfaction  This note is created using dictation transcription  It may contain typographical errors, grammatical errors, improperly dictated words, background noise and other errors  Subjective:     Patient ID: Mary Gates is a 54 y o  female  Chief Complaint:  46-year-old female follow-up right shoulder anterior dislocation after a fall at work on 7/18/2019  Patient has been attending physical therapy for shoulder rehabilitation  Her motion has improved  She denies pain  She still has some difficulty with repetitive lifting  She denies weakness  She is doing well at work with her work limitation      Allergy:  No Known Allergies  Medications:  all current active meds have been reviewed  Past Medical History:  Past Medical History:   Diagnosis Date    Depression      Past Surgical History:  Past Surgical History:   Procedure Laterality Date    ECTOPIC PREGNANCY SURGERY  2010     Family History:  Family History   Problem Relation Age of Onset    Diabetes Mother     Hypertension Mother     Heart disease Father     Hypertension Sister      Social History:  Social History     Substance and Sexual Activity   Alcohol Use Yes    Comment: Occasionally     Social History     Substance and Sexual Activity   Drug Use No     Social History     Tobacco Use   Smoking Status Current Every Day Smoker    Packs/day: 0 50    Types: Cigarettes   Smokeless Tobacco Never Used     Review of Systems   Constitutional: Negative  HENT: Negative  Eyes: Negative  Respiratory: Negative  Cardiovascular: Negative  Gastrointestinal: Negative  Endocrine: Negative  Genitourinary: Negative  Musculoskeletal: Negative for arthralgias  Skin: Negative  Allergic/Immunologic: Negative  Neurological: Negative  Hematological: Negative  Psychiatric/Behavioral: Negative  Objective:  BP Readings from Last 1 Encounters:   10/16/19 118/80      Wt Readings from Last 1 Encounters:   10/16/19 125 kg (276 lb)      BMI:   Estimated body mass index is 44 55 kg/m² as calculated from the following:    Height as of this encounter: 5' 6" (1 676 m)  Weight as of this encounter: 125 kg (276 lb)  BSA:   Estimated body surface area is 2 29 meters squared as calculated from the following:    Height as of this encounter: 5' 6" (1 676 m)  Weight as of this encounter: 125 kg (276 lb)  Physical Exam   Constitutional: She is oriented to person, place, and time  She appears well-developed  HENT:   Head: Normocephalic and atraumatic  Eyes: Conjunctivae and EOM are normal    Neck: Neck supple  Pulmonary/Chest: Effort normal    Neurological: She is alert and oriented to person, place, and time  Skin: Skin is warm  Psychiatric: She has a normal mood and affect  Nursing note and vitals reviewed  Right Shoulder Exam     Tenderness   The patient is experiencing no tenderness      Range of Motion   Active abduction: 150   Passive abduction: 160   Forward flexion: 160     Muscle Strength   Abduction: 4/5   Internal rotation: 5/5   External rotation: 4/5   Biceps: 5/5     Tests   Apprehension: negative  Drop arm: negative    Other   Erythema: absent  Sensation: normal  Pulse: present No new images for review

## 2019-10-16 NOTE — ASSESSMENT & PLAN NOTE
Findings consistent with right shoulder anterior dislocation  Discussed findings and treatment options with the patient  Patient's shoulder function is improving  I recommended her to continue physical therapy to rehabilitate the shoulder  We will continue to limit her work activity using the right arm  I would like to see patient back in 4 weeks for re-evaluation  Her progress was discussed with her   All patient's questions were answered to her satisfaction  This note is created using dictation transcription  It may contain typographical errors, grammatical errors, improperly dictated words, background noise and other errors

## 2019-11-13 ENCOUNTER — TELEPHONE (OUTPATIENT)
Dept: OBGYN CLINIC | Facility: HOSPITAL | Age: 49
End: 2019-11-13

## 2019-11-13 NOTE — TELEPHONE ENCOUNTER
Alexsander Talbot, nurse    [phone]    Sundeep Mcmahon was asked by [facility],  to let the doctor know that the patient is making remarks at work that she is going to prolong this work comp case  Patient is doing everything on the work restrictions that she is not suppose to like reaching above her head & lifting more than 10 lbs

## 2019-11-14 ENCOUNTER — OFFICE VISIT (OUTPATIENT)
Dept: OBGYN CLINIC | Facility: CLINIC | Age: 49
End: 2019-11-14
Payer: OTHER MISCELLANEOUS

## 2019-11-14 VITALS
HEART RATE: 80 BPM | DIASTOLIC BLOOD PRESSURE: 80 MMHG | SYSTOLIC BLOOD PRESSURE: 120 MMHG | BODY MASS INDEX: 44.36 KG/M2 | WEIGHT: 276 LBS | HEIGHT: 66 IN

## 2019-11-14 DIAGNOSIS — S43.014D: Primary | ICD-10-CM

## 2019-11-14 PROCEDURE — 99213 OFFICE O/P EST LOW 20 MIN: CPT | Performed by: ORTHOPAEDIC SURGERY

## 2019-11-14 NOTE — PROGRESS NOTES
Assessment:     1  Traumatic anterior dislocation of right shoulder, subsequent encounter        Plan:     Problem List Items Addressed This Visit        Musculoskeletal and Integument    Traumatic anterior dislocation of right shoulder - Primary     - Findings consistent with traumatic right shoulder anterior dislocation  Discussed findings and treatment options with the patient  - At this time, it's recommended to start work hardening program to simulate what the patient will be doing at work  - Her work restrictions are lifted to [employer] with no repetitive overhead  - Her progress was discussed with her   All patient's questions were answered to her satisfaction   - Follow up in 1 month         Relevant Orders    Ambulatory referral to Physical Therapy         Subjective:     Patient ID: Santi Carter is a 54 y o  female  Chief Complaint: RIGHT shoulder pain  Patient returns today for a 4 week follow up for her RIGHT shoulder anterior dislocation after a work fall on 7/18/19  She has been treating with formal physical therapy  Her therapist feels that she can do most of her exercises at home  Her last formal session is Friday  She is working on a work restriction of max lifting 10lbs, no pushing or pulling, but today she is more sore than usual because she worked by herself for the first time  She put stock away and lifting pots of boiling water  It was difficult for her  She has no sense of instability  She takes IBU 400mg as needed  Usually 1x/week  She is a cook       Allergy:  No Known Allergies  Medications:  all current active meds have been reviewed  Past Medical History:  Past Medical History:   Diagnosis Date    Depression      Past Surgical History:  Past Surgical History:   Procedure Laterality Date    ECTOPIC PREGNANCY SURGERY  2010     Family History:  Family History   Problem Relation Age of Onset    Diabetes Mother     Hypertension Mother     Heart disease Father  Hypertension Sister      Social History:  Social History     Substance and Sexual Activity   Alcohol Use Yes    Comment: Occasionally     Social History     Substance and Sexual Activity   Drug Use No     Social History     Tobacco Use   Smoking Status Current Every Day Smoker    Packs/day: 0 50    Types: Cigarettes   Smokeless Tobacco Never Used     Review of Systems   Constitutional: Negative  HENT: Negative  Eyes: Negative  Respiratory: Negative  Cardiovascular: Negative  Gastrointestinal: Negative  Endocrine: Negative  Genitourinary: Negative  Musculoskeletal: Negative for arthralgias  Skin: Negative  Allergic/Immunologic: Negative  Neurological: Negative  Hematological: Negative  Psychiatric/Behavioral: Negative  Objective:  BP Readings from Last 1 Encounters:   11/14/19 120/80      Wt Readings from Last 1 Encounters:   11/14/19 125 kg (276 lb)      BMI:   Estimated body mass index is 44 55 kg/m² as calculated from the following:    Height as of this encounter: 5' 6" (1 676 m)  Weight as of this encounter: 125 kg (276 lb)  BSA:   Estimated body surface area is 2 29 meters squared as calculated from the following:    Height as of this encounter: 5' 6" (1 676 m)  Weight as of this encounter: 125 kg (276 lb)  Physical Exam   Constitutional: She is oriented to person, place, and time  She appears well-developed  HENT:   Head: Normocephalic and atraumatic  Eyes: Conjunctivae and EOM are normal    Neck: Neck supple  Pulmonary/Chest: Effort normal    Neurological: She is alert and oriented to person, place, and time  Skin: Skin is warm  Psychiatric: She has a normal mood and affect  Nursing note and vitals reviewed  Right Shoulder Exam     Tenderness   The patient is experiencing no tenderness      Range of Motion   Active abduction: 150 (limited with pain )   External rotation: normal   Forward flexion: 150   Internal rotation 0 degrees: T10 Muscle Strength   Right shoulder normal muscle strength: limited by pain with resisted ABduction with shoulder at 90 degrees    Abduction: 4/5   Internal rotation: 5/5   External rotation: 4/5   Biceps: 5/5     Tests   Apprehension: negative  Cross arm: negative  Impingement: negative  Drop arm: negative    Other   Erythema: absent  Sensation: normal  Pulse: present      Left Shoulder Exam     Range of Motion   Internal rotation 0 degrees: T8             None reviewed today     Scribe Attestation    I,:   Sarah Quintanilla am acting as a scribe while in the presence of the attending physician :        I,:   Tez Lopez MD personally performed the services described in this documentation    as scribed in my presence :

## 2019-11-14 NOTE — LETTER
November 14, 2019     Patient: Evangeline Sicard   YOB: 1970   Date of Visit: 11/14/2019       To Whom it May Concern:    Ev Solano is under my professional care  She was seen in my office on 11/14/2019  She may return to work with limitations max lifting 20lbs with no repetitive overhead  If you have any questions or concerns, please don't hesitate to call           Sincerely,          Carmen Ricks MD        CC: No Recipients

## 2019-11-14 NOTE — ASSESSMENT & PLAN NOTE
- Findings consistent with traumatic right shoulder anterior dislocation  Discussed findings and treatment options with the patient  - At this time, it's recommended to start work hardening program to simulate what the patient will be doing at work  - Her work restrictions are lifted to [employer] with no repetitive overhead  - Her progress was discussed with her     All patient's questions were answered to her satisfaction   - Follow up in 1 month

## 2019-11-19 ENCOUNTER — TELEPHONE (OUTPATIENT)
Dept: OBGYN CLINIC | Facility: HOSPITAL | Age: 49
End: 2019-11-19

## 2019-11-25 ENCOUNTER — TELEPHONE (OUTPATIENT)
Dept: OBGYN CLINIC | Facility: HOSPITAL | Age: 49
End: 2019-11-25

## 2019-11-25 NOTE — TELEPHONE ENCOUNTER
Patient called in  # [phone]    Patient said she can not do the work hardening therapy you want her to have at this time  Someone at work quite and she is now have to cover  She will advise when she is able to start

## 2019-11-25 NOTE — TELEPHONE ENCOUNTER
Maxine Piedra, [address] [facility] [phone]   [place] [phone]    Josie Calvin is calling because he wants to know what the goals & expectations are for this patient  Josie Calvin is stating that if she is working 50+ hours per week he doesn't think this is the right fit for the patient  Josie Calvin can modify the times for the patient  He could do 3 days per week for 3 hours with the max work hours of 30  Josie Calvin spoke to the  & she said whatever the doctor would like to do is what they accommodate

## 2019-11-26 NOTE — TELEPHONE ENCOUNTER
She needs to go to work hardening  We are trying to get her to as close to pre-injury state  I can make the work note that will fit patient's work schedule, but she needs to be able to attend work hardening

## 2019-11-26 NOTE — TELEPHONE ENCOUNTER
Josue Osorio given above information  He will bring patient in for an evaluation and will call back with his recommendation for work hardening

## 2019-12-02 NOTE — TELEPHONE ENCOUNTER
Josue Osorio from [facility] called and reports that he has evaluated patient and she will be seeing the patient 3 x per week for 3-4 hrs per day for 4 weeks for the work hardening program   Patient will need a note for her WC     Thank you

## 2019-12-12 ENCOUNTER — OFFICE VISIT (OUTPATIENT)
Dept: OBGYN CLINIC | Facility: CLINIC | Age: 49
End: 2019-12-12
Payer: OTHER MISCELLANEOUS

## 2019-12-12 VITALS
WEIGHT: 274 LBS | SYSTOLIC BLOOD PRESSURE: 140 MMHG | HEART RATE: 80 BPM | BODY MASS INDEX: 44.03 KG/M2 | HEIGHT: 66 IN | DIASTOLIC BLOOD PRESSURE: 82 MMHG

## 2019-12-12 DIAGNOSIS — S43.014D: Primary | ICD-10-CM

## 2019-12-12 PROBLEM — S43.014A: Status: RESOLVED | Noted: 2019-07-25 | Resolved: 2019-12-12

## 2019-12-12 PROCEDURE — 99213 OFFICE O/P EST LOW 20 MIN: CPT | Performed by: ORTHOPAEDIC SURGERY

## 2019-12-12 NOTE — ASSESSMENT & PLAN NOTE
Patient will transition from work hardening program to [employer] for right shoulder, resolved anterior dislocation 7/18/19  She has excellent strength and near full motion in all planes  She feels she can transition to full duty, work capabilities form will reflect this   Gradually return into all normal duties  Gladly see back if any issues

## 2019-12-12 NOTE — PROGRESS NOTES
Assessment:     1  Traumatic anterior dislocation of right shoulder, subsequent encounter        Plan:     Problem List Items Addressed This Visit        Musculoskeletal and Integument    RESOLVED: Traumatic anterior dislocation of right shoulder - Primary     Patient will transition from work hardening program to HEP for right shoulder, resolved anterior dislocation 7/18/19  She has excellent strength and near full motion in all planes  She feels she can transition to full duty, work capabilities form will reflect this   Gradually return into all normal duties  Gladly see back if any issues  Subjective:     Patient ID: Johanna Jett is a 54 y o  female  Chief Complaint:  Patient returns today for a 4 week follow up for her RIGHT shoulder anterior dislocation after a work fall on 7/18/19  She has been compliant with attending physical therapy and doing work hardening  Improved strength, still lacking AROM  Pain is well controlled  She is also doing her HEP  She is working currently with restrictions  Denies numbness or tingling in arm  Allergy:  No Known Allergies  Medications:  all current active meds have been reviewed  Past Medical History:  Past Medical History:   Diagnosis Date    Depression      Past Surgical History:  Past Surgical History:   Procedure Laterality Date    ECTOPIC PREGNANCY SURGERY  2010     Family History:  Family History   Problem Relation Age of Onset    Diabetes Mother     Hypertension Mother     Heart disease Father     Hypertension Sister      Social History:  Social History     Substance and Sexual Activity   Alcohol Use Yes    Comment: Occasionally     Social History     Substance and Sexual Activity   Drug Use No     Social History     Tobacco Use   Smoking Status Current Every Day Smoker    Packs/day: 0 50    Types: Cigarettes   Smokeless Tobacco Never Used     Review of Systems   Constitutional: Negative for chills and fever     HENT: Negative for drooling and sneezing  Eyes: Negative for redness  Respiratory: Negative for cough and wheezing  Cardiovascular: Negative  Gastrointestinal: Negative for nausea and vomiting  Genitourinary: Negative  Musculoskeletal: Negative for arthralgias  Neurological: Negative  Psychiatric/Behavioral: The patient is not nervous/anxious  Objective:  BP Readings from Last 1 Encounters:   12/12/19 140/82      Wt Readings from Last 1 Encounters:   12/12/19 124 kg (274 lb)      BMI:   Estimated body mass index is 44 22 kg/m² as calculated from the following:    Height as of this encounter: 5' 6" (1 676 m)  Weight as of this encounter: 124 kg (274 lb)  BSA:   Estimated body surface area is 2 28 meters squared as calculated from the following:    Height as of this encounter: 5' 6" (1 676 m)  Weight as of this encounter: 124 kg (274 lb)  Physical Exam   Constitutional: She is oriented to person, place, and time  She appears well-developed and well-nourished  HENT:   Head: Normocephalic and atraumatic  Eyes: Conjunctivae and EOM are normal    Neck: Neck supple  Pulmonary/Chest: Effort normal    Neurological: She is alert and oriented to person, place, and time  She has normal reflexes  Skin: Skin is warm and dry  Psychiatric: She has a normal mood and affect  Her behavior is normal  Judgment and thought content normal    Nursing note and vitals reviewed  Right Shoulder Exam     Tenderness   The patient is experiencing no tenderness      Range of Motion   Active abduction: 150   External rotation: 90   Forward flexion: 170   Internal rotation 0 degrees: T6     Muscle Strength   Abduction: 5/5   Internal rotation: 5/5   External rotation: 5/5     Tests   Apprehension: negative  Quiroz test: negative  Cross arm: negative  Impingement: negative  Drop arm: negative    Other   Erythema: absent  Scars: absent  Sensation: normal  Pulse: present            No new imaging to review     Scribe Attestation I,:   Shaniqua James am acting as a scribe while in the presence of the attending physician :        I,:   Freddie Stanford MD personally performed the services described in this documentation    as scribed in my presence :

## 2019-12-16 ENCOUNTER — TELEPHONE (OUTPATIENT)
Dept: FAMILY MEDICINE CLINIC | Facility: HOSPITAL | Age: 49
End: 2019-12-16

## 2021-05-25 ENCOUNTER — VBI (OUTPATIENT)
Dept: FAMILY MEDICINE CLINIC | Facility: HOSPITAL | Age: 51
End: 2021-05-25